# Patient Record
Sex: MALE | Race: WHITE
[De-identification: names, ages, dates, MRNs, and addresses within clinical notes are randomized per-mention and may not be internally consistent; named-entity substitution may affect disease eponyms.]

---

## 2022-05-02 ENCOUNTER — HOSPITAL ENCOUNTER (OUTPATIENT)
Dept: HOSPITAL 47 - LABWHC1 | Age: 62
Discharge: HOME | End: 2022-05-02
Attending: INTERNAL MEDICINE
Payer: COMMERCIAL

## 2022-05-02 DIAGNOSIS — E78.2: Primary | ICD-10-CM

## 2022-05-02 LAB
ALBUMIN SERPL-MCNC: 4.3 G/DL (ref 3.8–4.9)
ALBUMIN/GLOB SERPL: 1.54 G/DL (ref 1.6–3.17)
ALP SERPL-CCNC: 138 U/L (ref 41–126)
ALT SERPL-CCNC: 28 U/L (ref 10–49)
ANION GAP SERPL CALC-SCNC: 10.1 MMOL/L (ref 10–18)
AST SERPL-CCNC: 32 U/L (ref 14–35)
BUN SERPL-SCNC: 19.5 MG/DL (ref 9–27)
BUN/CREAT SERPL: 15 RATIO (ref 12–20)
CALCIUM SPEC-MCNC: 9.9 MG/DL (ref 8.7–10.3)
CHLORIDE SERPL-SCNC: 106 MMOL/L (ref 96–109)
CHOLEST SERPL-MCNC: 134 MG/DL (ref 0–200)
CO2 SERPL-SCNC: 22.9 MMOL/L (ref 20–27.5)
GLOBULIN SER CALC-MCNC: 2.8 G/DL (ref 1.6–3.3)
GLUCOSE SERPL-MCNC: 104 MG/DL (ref 70–110)
HDLC SERPL-MCNC: 34.9 MG/DL (ref 40–60)
LDLC SERPL CALC-MCNC: 66.5 MG/DL (ref 0–131)
POTASSIUM SERPL-SCNC: 4.4 MMOL/L (ref 3.5–5.5)
PROT SERPL-MCNC: 7.1 G/DL (ref 6.2–8.2)
SODIUM SERPL-SCNC: 139 MMOL/L (ref 135–145)
TRIGL SERPL-MCNC: 163 MG/DL (ref 0–149)
VLDLC SERPL CALC-MCNC: 32.6 MG/DL (ref 5–40)

## 2022-05-02 PROCEDURE — 36415 COLL VENOUS BLD VENIPUNCTURE: CPT

## 2022-05-02 PROCEDURE — 80053 COMPREHEN METABOLIC PANEL: CPT

## 2022-05-02 PROCEDURE — 80061 LIPID PANEL: CPT

## 2022-11-14 ENCOUNTER — HOSPITAL ENCOUNTER (OUTPATIENT)
Dept: HOSPITAL 47 - LABWHC1 | Age: 62
Discharge: HOME | End: 2022-11-14
Attending: INTERNAL MEDICINE
Payer: COMMERCIAL

## 2022-11-14 DIAGNOSIS — I10: Primary | ICD-10-CM

## 2022-11-14 DIAGNOSIS — E78.2: ICD-10-CM

## 2022-11-14 LAB
ALBUMIN SERPL-MCNC: 4.3 G/DL (ref 3.8–4.9)
ALBUMIN/GLOB SERPL: 1.87 G/DL (ref 1.6–3.17)
ALP SERPL-CCNC: 122 U/L (ref 41–126)
ALT SERPL-CCNC: 20 U/L (ref 10–49)
ANION GAP SERPL CALC-SCNC: 8.4 MMOL/L (ref 10–18)
AST SERPL-CCNC: 22 U/L (ref 14–35)
BUN SERPL-SCNC: 21.7 MG/DL (ref 9–27)
BUN/CREAT SERPL: 16.69 RATIO (ref 12–20)
CALCIUM SPEC-MCNC: 9.8 MG/DL (ref 8.7–10.3)
CHLORIDE SERPL-SCNC: 108 MMOL/L (ref 96–109)
CHOLEST SERPL-MCNC: 134 MG/DL (ref 0–200)
CO2 SERPL-SCNC: 24.6 MMOL/L (ref 20–27.5)
GLOBULIN SER CALC-MCNC: 2.3 G/DL (ref 1.6–3.3)
GLUCOSE SERPL-MCNC: 114 MG/DL (ref 70–110)
HDLC SERPL-MCNC: 38.6 MG/DL (ref 40–60)
LDLC SERPL CALC-MCNC: 68.4 MG/DL (ref 0–131)
POTASSIUM SERPL-SCNC: 4.5 MMOL/L (ref 3.5–5.5)
PROT SERPL-MCNC: 6.6 G/DL (ref 6.2–8.2)
SODIUM SERPL-SCNC: 141 MMOL/L (ref 135–145)
TRIGL SERPL-MCNC: 135 MG/DL (ref 0–149)
VLDLC SERPL CALC-MCNC: 27 MG/DL (ref 5–40)

## 2022-11-14 PROCEDURE — 80053 COMPREHEN METABOLIC PANEL: CPT

## 2022-11-14 PROCEDURE — 36415 COLL VENOUS BLD VENIPUNCTURE: CPT

## 2022-11-14 PROCEDURE — 80061 LIPID PANEL: CPT

## 2023-04-11 ENCOUNTER — HOSPITAL ENCOUNTER (OUTPATIENT)
Dept: HOSPITAL 47 - LABWHC1 | Age: 63
Discharge: HOME | End: 2023-04-11
Attending: NURSE PRACTITIONER
Payer: COMMERCIAL

## 2023-04-11 DIAGNOSIS — E78.2: Primary | ICD-10-CM

## 2023-04-11 LAB
ALT SERPL-CCNC: 32 U/L (ref 10–49)
AST SERPL-CCNC: 26 U/L (ref 14–35)
CHOLEST SERPL-MCNC: 134 MG/DL (ref 0–200)
HDLC SERPL-MCNC: 36.5 MG/DL (ref 40–60)
LDLC SERPL CALC-MCNC: 65.1 MG/DL (ref 0–131)
TRIGL SERPL-MCNC: 162 MG/DL (ref 0–149)
VLDLC SERPL CALC-MCNC: 32.4 MG/DL (ref 5–40)

## 2023-04-11 PROCEDURE — 80061 LIPID PANEL: CPT

## 2023-04-11 PROCEDURE — 84450 TRANSFERASE (AST) (SGOT): CPT

## 2023-04-11 PROCEDURE — 84460 ALANINE AMINO (ALT) (SGPT): CPT

## 2023-04-11 PROCEDURE — 36415 COLL VENOUS BLD VENIPUNCTURE: CPT

## 2024-10-23 ENCOUNTER — HOSPITAL ENCOUNTER (OUTPATIENT)
Dept: HOSPITAL 47 - LABWHC1 | Age: 64
Discharge: HOME | End: 2024-10-23
Attending: NURSE PRACTITIONER
Payer: COMMERCIAL

## 2024-10-23 DIAGNOSIS — E78.2: Primary | ICD-10-CM

## 2024-10-23 LAB
ALT SERPL-CCNC: 37 U/L (ref 10–49)
AST SERPL-CCNC: 27 U/L (ref 14–35)
CHOLEST SERPL-MCNC: 137 MG/DL (ref 0–200)
HDLC SERPL-MCNC: 39.4 MG/DL (ref 40–60)
LDLC SERPL CALC-MCNC: 68.2 MG/DL (ref 0–131)
TRIGL SERPL-MCNC: 147 MG/DL (ref 0–149)
VLDLC SERPL CALC-MCNC: 29.4 MG/DL (ref 5–40)

## 2024-10-23 PROCEDURE — 84460 ALANINE AMINO (ALT) (SGPT): CPT

## 2024-10-23 PROCEDURE — 84450 TRANSFERASE (AST) (SGOT): CPT

## 2024-10-23 PROCEDURE — 80061 LIPID PANEL: CPT

## 2024-10-23 PROCEDURE — 36415 COLL VENOUS BLD VENIPUNCTURE: CPT

## 2024-11-06 ENCOUNTER — HOSPITAL ENCOUNTER (OUTPATIENT)
Dept: HOSPITAL 47 - LABWHC1 | Age: 64
Discharge: HOME | End: 2024-11-06
Attending: DERMATOLOGY
Payer: COMMERCIAL

## 2024-11-06 DIAGNOSIS — L40.0: ICD-10-CM

## 2024-11-06 DIAGNOSIS — Z79.899: Primary | ICD-10-CM

## 2024-11-06 LAB — HBV SURFACE AB SERPL IA-ACNC: 7 MIU/ML

## 2024-11-06 PROCEDURE — 86480 TB TEST CELL IMMUN MEASURE: CPT

## 2024-11-06 PROCEDURE — 36415 COLL VENOUS BLD VENIPUNCTURE: CPT

## 2024-11-06 PROCEDURE — 87340 HEPATITIS B SURFACE AG IA: CPT

## 2024-11-06 PROCEDURE — 86803 HEPATITIS C AB TEST: CPT

## 2024-11-06 PROCEDURE — 86706 HEP B SURFACE ANTIBODY: CPT

## 2024-11-06 PROCEDURE — 86705 HEP B CORE ANTIBODY IGM: CPT

## 2024-12-16 ENCOUNTER — HOSPITAL ENCOUNTER (OUTPATIENT)
Dept: HOSPITAL 47 - RADXRMAIN | Age: 64
Discharge: HOME | End: 2024-12-16
Attending: INTERNAL MEDICINE
Payer: COMMERCIAL

## 2024-12-16 DIAGNOSIS — Z86.15: Primary | ICD-10-CM

## 2024-12-16 PROCEDURE — 71046 X-RAY EXAM CHEST 2 VIEWS: CPT

## 2024-12-16 NOTE — XR
2 view chest

 

HISTORY: Latent TB

 

COMPARISON: 6/2/2014

 

TECHNIQUE: PA and lateral views chest obtained.

 

FINDINGS:

 

The lungs are clear of consolidative, interstitial or masslike opacity.

 

There is no pleural effusion, pleural thickening or pneumothorax.

 

The heart, pulmonary vasculature, mediastinum and myles are within normal limits.

 

The osseous structures and soft tissues of the thorax are intact.

 

IMPRESSION:

 

No significant abnormality. No acute cardiopulmonary disease.

 

[facility], Workstation: [email or web address], 12/16/2024 2:57 PM